# Patient Record
Sex: FEMALE | Race: WHITE | NOT HISPANIC OR LATINO | Employment: FULL TIME | URBAN - METROPOLITAN AREA
[De-identification: names, ages, dates, MRNs, and addresses within clinical notes are randomized per-mention and may not be internally consistent; named-entity substitution may affect disease eponyms.]

---

## 2017-06-29 ENCOUNTER — PROCEDURE VISIT (OUTPATIENT)
Dept: OBSTETRICS AND GYNECOLOGY | Age: 37
End: 2017-06-29

## 2017-06-29 ENCOUNTER — OFFICE VISIT (OUTPATIENT)
Dept: OBSTETRICS AND GYNECOLOGY | Age: 37
End: 2017-06-29

## 2017-06-29 VITALS
DIASTOLIC BLOOD PRESSURE: 70 MMHG | SYSTOLIC BLOOD PRESSURE: 110 MMHG | WEIGHT: 178 LBS | HEIGHT: 69 IN | BODY MASS INDEX: 26.36 KG/M2

## 2017-06-29 DIAGNOSIS — Z32.00 ENCOUNTER FOR CONFIRMATION OF PREGNANCY TEST RESULT WITH PHYSICAL EXAMINATION: ICD-10-CM

## 2017-06-29 DIAGNOSIS — O36.80X0 PREGNANCY WITH INCONCLUSIVE FETAL VIABILITY, NOT APPLICABLE OR UNSPECIFIED FETUS: Primary | ICD-10-CM

## 2017-06-29 DIAGNOSIS — Z34.81 PRENATAL CARE, SUBSEQUENT PREGNANCY, FIRST TRIMESTER: ICD-10-CM

## 2017-06-29 DIAGNOSIS — Z3A.09 9 WEEKS GESTATION OF PREGNANCY: Primary | ICD-10-CM

## 2017-06-29 DIAGNOSIS — O09.521 AMA (ADVANCED MATERNAL AGE) MULTIGRAVIDA 35+, FIRST TRIMESTER: ICD-10-CM

## 2017-06-29 PROBLEM — O09.529 AMA (ADVANCED MATERNAL AGE) MULTIGRAVIDA 35+: Status: ACTIVE | Noted: 2017-06-29

## 2017-06-29 PROCEDURE — 99213 OFFICE O/P EST LOW 20 MIN: CPT | Performed by: OBSTETRICS & GYNECOLOGY

## 2017-06-29 PROCEDURE — 76817 TRANSVAGINAL US OBSTETRIC: CPT | Performed by: OBSTETRICS & GYNECOLOGY

## 2017-06-29 RX ORDER — PRENATAL VIT NO.126/IRON/FOLIC 28MG-0.8MG
TABLET ORAL DAILY
COMMUNITY

## 2017-06-29 NOTE — PROGRESS NOTES
"GYN Visit    2017    Patient: Mikayla Li          MR#:[id number]      Chief Complaint   Patient presents with   • Initial Prenatal Visit     PT HERE TO EST VIABILITY OF PREGNANCY LMP 17. BETHANY 18.       History of Present Illness    36 y.o. 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    ________________________________________  Patient Active Problem List   Diagnosis   • AMA (advanced maternal age) multigravida 35+       No past medical history on file.    No past surgical history on file.    History   Smoking Status   • Not on file   Smokeless Tobacco   • Not on file       has a current medication list which includes the following prescription(s): prenatal (classic) vitamin.  ________________________________________    Current contraception: none      The following portions of the patient's history were reviewed and updated as appropriate: allergies, current medications, past family history, past medical history, past social history, past surgical history and problem list.    Review of Systems   Constitutional: Positive for fatigue. Negative for chills and fever.   Gastrointestinal: Negative for nausea and vomiting.   Genitourinary: Negative for dysuria, pelvic pain and vaginal bleeding.   Psychiatric/Behavioral: Negative for dysphoric mood.   All other systems reviewed and are negative.           Objective   Physical Exam    /70  Ht 69\" (175.3 cm)  Wt 178 lb (80.7 kg)  LMP 2017 (Exact Date)  BMI 26.29 kg/m2   BP Readings from Last 3 Encounters:   17 110/70      Wt Readings from Last 3 Encounters:   17 178 lb (80.7 kg)      BMI: Estimated body mass index is 26.29 kg/(m^2) as calculated " "from the following:    Height as of this encounter: 69\" (175.3 cm).    Weight as of this encounter: 178 lb (80.7 kg).      General:   alert, appears stated age and cooperative   Abdomen: soft, non-tender, without masses or organomegaly   Breast: Not examined   Vulva: normal   Vagina: normal mucosa   Cervix: no cervical motion tenderness and no lesions   Uterus: mobile, non-tender or size consistent with 9 weeks   Adnexa: normal adnexa and no mass, fullness, tenderness     US done in office:  Viable IUP cw LMP    Assessment:      Mikayla was seen today for initial prenatal visit.    Diagnoses and all orders for this visit:    9 weeks gestation of pregnancy  -     OB Panel With HIV  -     Hepatitis C Antibody  -     Urine Culture  -     Chlamydia trachomatis, Neisseria gonorrhoeae, PCR w/ confirmation    Encounter for confirmation of pregnancy test result with physical examination    AMA (advanced maternal age) multigravida 35+, first trimester        Will do NIPS testing at [facility]  Follow up 4 weeks prenatal visit      "

## 2017-07-01 LAB
ABO GROUP BLD: (no result)
BACTERIA UR CULT: NO GROWTH
BACTERIA UR CULT: NORMAL
BASOPHILS # BLD AUTO: 0 X10E3/UL (ref 0–0.2)
BASOPHILS NFR BLD AUTO: 0 %
BLD GP AB SCN SERPL QL: NEGATIVE
C TRACH RRNA SPEC QL NAA+PROBE: NEGATIVE
EOSINOPHIL # BLD AUTO: 0 X10E3/UL (ref 0–0.4)
EOSINOPHIL NFR BLD AUTO: 0 %
ERYTHROCYTE [DISTWIDTH] IN BLOOD BY AUTOMATED COUNT: 13.6 % (ref 12.3–15.4)
HBV SURFACE AG SERPL QL IA: NEGATIVE
HCT VFR BLD AUTO: 38.5 % (ref 34–46.6)
HCV AB S/CO SERPL IA: <0.1 S/CO RATIO (ref 0–0.9)
HGB BLD-MCNC: 13 G/DL (ref 11.1–15.9)
HIV 1+2 AB+HIV1 P24 AG SERPL QL IA: NON REACTIVE
IMM GRANULOCYTES # BLD: 0 X10E3/UL (ref 0–0.1)
IMM GRANULOCYTES NFR BLD: 0 %
LYMPHOCYTES # BLD AUTO: 1.5 X10E3/UL (ref 0.7–3.1)
LYMPHOCYTES NFR BLD AUTO: 19 %
MCH RBC QN AUTO: 29.5 PG (ref 26.6–33)
MCHC RBC AUTO-ENTMCNC: 33.8 G/DL (ref 31.5–35.7)
MCV RBC AUTO: 87 FL (ref 79–97)
MONOCYTES # BLD AUTO: 0.4 X10E3/UL (ref 0.1–0.9)
MONOCYTES NFR BLD AUTO: 5 %
N GONORRHOEA RRNA SPEC QL NAA+PROBE: NEGATIVE
NEUTROPHILS # BLD AUTO: 6.1 X10E3/UL (ref 1.4–7)
NEUTROPHILS NFR BLD AUTO: 76 %
PLATELET # BLD AUTO: 223 X10E3/UL (ref 150–379)
RBC # BLD AUTO: 4.41 X10E6/UL (ref 3.77–5.28)
RH BLD: POSITIVE
RPR SER QL: NON REACTIVE
RUBV IGG SERPL IA-ACNC: 2.99 INDEX
WBC # BLD AUTO: 8 X10E3/UL (ref 3.4–10.8)

## 2017-07-27 ENCOUNTER — ROUTINE PRENATAL (OUTPATIENT)
Dept: OBSTETRICS AND GYNECOLOGY | Age: 37
End: 2017-07-27

## 2017-07-27 VITALS — DIASTOLIC BLOOD PRESSURE: 64 MMHG | BODY MASS INDEX: 26.29 KG/M2 | WEIGHT: 178 LBS | SYSTOLIC BLOOD PRESSURE: 122 MMHG

## 2017-07-27 DIAGNOSIS — Z3A.13 13 WEEKS GESTATION OF PREGNANCY: Primary | ICD-10-CM

## 2017-07-27 DIAGNOSIS — O09.521 AMA (ADVANCED MATERNAL AGE) MULTIGRAVIDA 35+, FIRST TRIMESTER: ICD-10-CM

## 2017-07-27 PROCEDURE — 0501F PRENATAL FLOW SHEET: CPT | Performed by: OBSTETRICS & GYNECOLOGY

## 2017-07-27 NOTE — PROGRESS NOTES
Feeling well, NIPs testing, will do gender reveal, afp at follow up, anatomy at 20 weeks  No complaints  Traveling to [place] in a couple of weeks

## 2017-08-03 ENCOUNTER — TELEPHONE (OUTPATIENT)
Dept: OBSTETRICS AND GYNECOLOGY | Age: 37
End: 2017-08-03

## 2017-08-04 ENCOUNTER — TELEPHONE (OUTPATIENT)
Dept: OBSTETRICS AND GYNECOLOGY | Age: 37
End: 2017-08-04

## 2017-08-10 ENCOUNTER — TELEPHONE (OUTPATIENT)
Dept: OBSTETRICS AND GYNECOLOGY | Age: 37
End: 2017-08-10

## 2017-08-10 RX ORDER — DOXYLAMINE SUCCINATE AND PYRIDOXINE HYDROCHLORIDE, DELAYED RELEASE TABLETS 10 MG/10 MG 10; 10 MG/1; MG/1
1 TABLET, DELAYED RELEASE ORAL 3 TIMES DAILY
Qty: 100 TABLET | Refills: 0 | Status: SHIPPED | OUTPATIENT
Start: 2017-08-10 | End: 2017-08-24

## 2017-08-10 NOTE — TELEPHONE ENCOUNTER
Dr GRAVES pt, 15 wks preg. States increased morning sickness over last two weeks. Pt states had diarrhea for 4 days last week and took imodium to clear it up. Diarrhea came back this week, has taken imodium and cleared up for now. Pt still having vomiting. Denies decreased skin turgor. Pt states she did have some chills last week, denies any chills currently and denies any fever. Please advise.    Pt # [phone]

## 2017-08-10 NOTE — TELEPHONE ENCOUNTER
Pt has been not'd of the B6 w/ unisom treatment. Would like something sent to pharm, has not tried anything no pref. On med. Does not feel dehydrated but will go for fluids of seh feels liek she has become dehydrated, already doing small frequent liquids.

## 2017-08-10 NOTE — TELEPHONE ENCOUNTER
If she feels dehydrated at all she should go to the hospital for IV hydration.  Has she tried anything for the morning sickness?  I can send in diclegis if she wants to try it or phenergan prn but that does cause drowsiness.  Using immodium is ok and hydrate orally  With very small amounts every few hours to prevent dehydration.

## 2017-08-11 ENCOUNTER — TELEPHONE (OUTPATIENT)
Dept: OBSTETRICS AND GYNECOLOGY | Age: 37
End: 2017-08-11

## 2017-08-11 RX ORDER — ERYTHROMYCIN BASE IN ETHANOL 2 %
1 SWAB, MEDICATED TOPICAL 2 TIMES DAILY
Qty: 60 EACH | Refills: 2 | Status: SHIPPED | OUTPATIENT
Start: 2017-08-11 | End: 2017-09-12

## 2017-08-11 NOTE — TELEPHONE ENCOUNTER
Can use OTC benzyl peroxide washes and spot cream, I can also send in erythromycin pads to use twice daily, does not cross placenta- I sent into pharm

## 2017-08-11 NOTE — TELEPHONE ENCOUNTER
Pt is having cystic acne and is wanting to know what she can take to help? Pt states they have become painful.

## 2017-08-24 ENCOUNTER — ROUTINE PRENATAL (OUTPATIENT)
Dept: OBSTETRICS AND GYNECOLOGY | Age: 37
End: 2017-08-24

## 2017-08-24 VITALS — SYSTOLIC BLOOD PRESSURE: 106 MMHG | WEIGHT: 179 LBS | BODY MASS INDEX: 26.43 KG/M2 | DIASTOLIC BLOOD PRESSURE: 66 MMHG

## 2017-08-24 DIAGNOSIS — Z13.79 ENCOUNTER FOR OTHER SCREENING FOR GENETIC AND CHROMOSOMAL ANOMALIES: ICD-10-CM

## 2017-08-24 DIAGNOSIS — Z3A.17 17 WEEKS GESTATION OF PREGNANCY: Primary | ICD-10-CM

## 2017-08-24 DIAGNOSIS — O09.521 AMA (ADVANCED MATERNAL AGE) MULTIGRAVIDA 35+, FIRST TRIMESTER: ICD-10-CM

## 2017-08-24 PROCEDURE — 0502F SUBSEQUENT PRENATAL CARE: CPT | Performed by: OBSTETRICS & GYNECOLOGY

## 2017-08-24 NOTE — PROGRESS NOTES
Going to [place] and [place] in next month, rec start ASA 81 mg daily for AMA and extensive travel  AFP today  Having cystic acne issues, will make appt with derm, same deep nodules for over a month- will try short antibiotic course  Nausea improved  Wants compression socks  Anatomy scan at [facility]- with travel schedule on time they can do it is on a Tuesday morning, I am not in office but I am on call and available- sched with NP  Gender placed in envelope from nips testing for gender reveal party

## 2017-08-26 LAB
AFP ADJ MOM SERPL: 1.39
AFP INTERP SERPL-IMP: NORMAL
AFP INTERP SERPL-IMP: NORMAL
AFP SERPL-MCNC: 46.9 NG/ML
AGE AT DELIVERY: 35.3 YEARS
GA METHOD: NORMAL
GA: 17 WEEKS
IDDM PATIENT QL: NO
LABORATORY COMMENT REPORT: NORMAL
MULTIPLE PREGNANCY: NO
NEURAL TUBE DEFECT RISK FETUS: 3704 %
RESULT: NORMAL

## 2017-08-28 ENCOUNTER — TELEPHONE (OUTPATIENT)
Dept: OBSTETRICS AND GYNECOLOGY | Age: 37
End: 2017-08-28

## 2017-08-28 NOTE — TELEPHONE ENCOUNTER
Dr GRAVES pt was given instructions to start baby asa daily - pt is concerned after looking on line and wants to know why Dr GRAVES recommends daily? If it due to her plane travel can she just take it the day before a flight and the day of the flight instead of daily? Also the pt has a topical clobetasol propionate  0.05% is this ok to use in pregnancy?

## 2017-08-28 NOTE — TELEPHONE ENCOUNTER
Risk factors for preeclampsia is first baby and age (AMA)- the baby aspirin is used to prevent preeclampsia  She is not high risk but she is not at the lowest risk because of age  Long plane flights made me lean toward treatment   Little risk to doing it but I am ok if she doesn't want to do it. - her choice    Discussed with pt, she will probably take it

## 2017-09-12 ENCOUNTER — ROUTINE PRENATAL (OUTPATIENT)
Dept: OBSTETRICS AND GYNECOLOGY | Age: 37
End: 2017-09-12

## 2017-09-12 ENCOUNTER — PROCEDURE VISIT (OUTPATIENT)
Dept: OBSTETRICS AND GYNECOLOGY | Age: 37
End: 2017-09-12

## 2017-09-12 VITALS — DIASTOLIC BLOOD PRESSURE: 60 MMHG | BODY MASS INDEX: 27.47 KG/M2 | WEIGHT: 186 LBS | SYSTOLIC BLOOD PRESSURE: 110 MMHG

## 2017-09-12 DIAGNOSIS — Z3A.19 19 WEEKS GESTATION OF PREGNANCY: Primary | ICD-10-CM

## 2017-09-12 DIAGNOSIS — Z34.92 SECOND TRIMESTER FETUS: Primary | ICD-10-CM

## 2017-09-12 PROCEDURE — 0502F SUBSEQUENT PRENATAL CARE: CPT | Performed by: PHYSICIAN ASSISTANT

## 2017-09-12 PROCEDURE — 76805 OB US >/= 14 WKS SNGL FETUS: CPT | Performed by: PHYSICIAN ASSISTANT

## 2017-09-12 RX ORDER — ASPIRIN 325 MG
325 TABLET ORAL DAILY
COMMUNITY
End: 2017-10-12

## 2017-09-12 NOTE — PROGRESS NOTES
This is a routine Ob visit for 19wk5 day. She is also having her anatomic scan.  It was wnl.  She is having a boy (they already knew gender).  She just returned from [place] and her trip went well. She had no c/o and didn't need to use compression hose.  She is getting ready to go to [place] for work.  Plans to walk when able and use compression hose if needed.  Will plan f/u in 4 wks.

## 2017-10-12 ENCOUNTER — ROUTINE PRENATAL (OUTPATIENT)
Dept: OBSTETRICS AND GYNECOLOGY | Age: 37
End: 2017-10-12

## 2017-10-12 VITALS — DIASTOLIC BLOOD PRESSURE: 60 MMHG | WEIGHT: 185 LBS | SYSTOLIC BLOOD PRESSURE: 120 MMHG | BODY MASS INDEX: 27.32 KG/M2

## 2017-10-12 DIAGNOSIS — Z23 FLU VACCINE NEED: ICD-10-CM

## 2017-10-12 DIAGNOSIS — Z3A.24 24 WEEKS GESTATION OF PREGNANCY: Primary | ICD-10-CM

## 2017-10-12 PROCEDURE — 90471 IMMUNIZATION ADMIN: CPT | Performed by: OBSTETRICS & GYNECOLOGY

## 2017-10-12 PROCEDURE — 90686 IIV4 VACC NO PRSV 0.5 ML IM: CPT | Performed by: OBSTETRICS & GYNECOLOGY

## 2017-10-12 PROCEDURE — 0502F SUBSEQUENT PRENATAL CARE: CPT | Performed by: OBSTETRICS & GYNECOLOGY

## 2017-10-12 NOTE — PROGRESS NOTES
Doing well, late in day and unable to do 1 hr GCT, pelvic pressure but intermittent and no contractions, discharge , leaking or bleeding  Cervical check reassuring, will do 1 hr GCT and CBC 10/20

## 2017-10-20 ENCOUNTER — LAB (OUTPATIENT)
Dept: OBSTETRICS AND GYNECOLOGY | Age: 37
End: 2017-10-20

## 2017-10-20 DIAGNOSIS — Z13.1 SCREENING FOR DIABETES MELLITUS: Primary | ICD-10-CM

## 2017-10-20 DIAGNOSIS — Z3A.25 25 WEEKS GESTATION OF PREGNANCY: ICD-10-CM

## 2017-10-21 LAB
BASOPHILS # BLD AUTO: 0.02 10*3/MM3 (ref 0–0.2)
BASOPHILS NFR BLD AUTO: 0.2 % (ref 0–1.5)
EOSINOPHIL # BLD AUTO: 0.03 10*3/MM3 (ref 0–0.7)
EOSINOPHIL NFR BLD AUTO: 0.3 % (ref 0.3–6.2)
ERYTHROCYTE [DISTWIDTH] IN BLOOD BY AUTOMATED COUNT: 13.6 % (ref 11.7–13)
GLUCOSE 1H P 50 G GLC PO SERPL-MCNC: 109 MG/DL (ref 65–139)
HCT VFR BLD AUTO: 38.4 % (ref 35.6–45.5)
HGB BLD-MCNC: 12.7 G/DL (ref 11.9–15.5)
IMM GRANULOCYTES # BLD: 0.02 10*3/MM3 (ref 0–0.03)
IMM GRANULOCYTES NFR BLD: 0.2 % (ref 0–0.5)
LYMPHOCYTES # BLD AUTO: 1.68 10*3/MM3 (ref 0.9–4.8)
LYMPHOCYTES NFR BLD AUTO: 15.5 % (ref 19.6–45.3)
MCH RBC QN AUTO: 31.4 PG (ref 26.9–32)
MCHC RBC AUTO-ENTMCNC: 33.1 G/DL (ref 32.4–36.3)
MCV RBC AUTO: 95 FL (ref 80.5–98.2)
MONOCYTES # BLD AUTO: 0.33 10*3/MM3 (ref 0.2–1.2)
MONOCYTES NFR BLD AUTO: 3.1 % (ref 5–12)
NEUTROPHILS # BLD AUTO: 8.73 10*3/MM3 (ref 1.9–8.1)
NEUTROPHILS NFR BLD AUTO: 80.7 % (ref 42.7–76)
PLATELET # BLD AUTO: 216 10*3/MM3 (ref 140–500)
RBC # BLD AUTO: 4.04 10*6/MM3 (ref 3.9–5.2)
WBC # BLD AUTO: 10.81 10*3/MM3 (ref 4.5–10.7)

## 2017-10-24 ENCOUNTER — TELEPHONE (OUTPATIENT)
Dept: OBSTETRICS AND GYNECOLOGY | Age: 37
End: 2017-10-24

## 2017-10-24 NOTE — TELEPHONE ENCOUNTER
You can work her in today or tomorrow with me.  This sounds like round ligament pain but can evaluate to be sure.

## 2017-10-24 NOTE — TELEPHONE ENCOUNTER
Dr GRAVES pt, 25 wks preg. States since yesterday afternoon has had a dull ache in her right side, goes across her lower abdomen. States last night the pain was a sharp stabbing pain. Hurts to walk/move around. Pt states it does not feel like contractions, denies any bleeding. Please advise, no US will need to be WI if done.    Pt # [phone]

## 2017-10-25 ENCOUNTER — ROUTINE PRENATAL (OUTPATIENT)
Dept: OBSTETRICS AND GYNECOLOGY | Age: 37
End: 2017-10-25

## 2017-10-25 VITALS — DIASTOLIC BLOOD PRESSURE: 70 MMHG | BODY MASS INDEX: 27.91 KG/M2 | WEIGHT: 189 LBS | SYSTOLIC BLOOD PRESSURE: 120 MMHG

## 2017-10-25 DIAGNOSIS — R10.2 PELVIC PAIN AFFECTING PREGNANCY IN SECOND TRIMESTER, ANTEPARTUM: Primary | ICD-10-CM

## 2017-10-25 DIAGNOSIS — O26.892 PELVIC PAIN AFFECTING PREGNANCY IN SECOND TRIMESTER, ANTEPARTUM: Primary | ICD-10-CM

## 2017-10-25 LAB
BILIRUB BLD-MCNC: NEGATIVE MG/DL
CLARITY, POC: CLEAR
COLOR UR: YELLOW
GLUCOSE UR STRIP-MCNC: NEGATIVE MG/DL
KETONES UR QL: NEGATIVE
LEUKOCYTE EST, POC: NEGATIVE
NITRITE UR-MCNC: NEGATIVE MG/ML
PH UR: 6 [PH] (ref 5–8)
PROT UR STRIP-MCNC: NEGATIVE MG/DL
RBC # UR STRIP: NEGATIVE /UL
SP GR UR: 1.02 (ref 1–1.03)
UROBILINOGEN UR QL: NORMAL

## 2017-10-25 PROCEDURE — 0502F SUBSEQUENT PRENATAL CARE: CPT | Performed by: PHYSICIAN ASSISTANT

## 2017-10-25 PROCEDURE — 81002 URINALYSIS NONAUTO W/O SCOPE: CPT | Performed by: PHYSICIAN ASSISTANT

## 2017-10-25 NOTE — PROGRESS NOTES
This is a 25 wk pregnant pt that is having some pain that starts at the top of her fundus and radiates to the rlq. It started at night. Does seem to be better today.  Worse with movement. No pattern to it.  She did do a workout the other day that may have started the symptoms.  She denies VB and good FM. No change in bladder or bowel function. No fever or N/V.  Physical exam of pt is wnl. She is in NAD.  Suspect round ligament pain. Enc tylenol prn pain and enc belly support. Gave HO.  Will send urine as well. A/P-25 wk pregnant pt with rlq pain, neg urine and good FM. Suspect round ligament pain. R/o UTI and get belly support. If s/s don't improve, consider PT and/or call for f/u. F/u as scheduled

## 2017-10-27 LAB
BACTERIA UR CULT: NO GROWTH
BACTERIA UR CULT: NORMAL

## 2017-10-30 ENCOUNTER — TELEPHONE (OUTPATIENT)
Dept: OBSTETRICS AND GYNECOLOGY | Age: 37
End: 2017-10-30

## 2017-10-30 NOTE — TELEPHONE ENCOUNTER
----- Message from JAY JAY Ferguson sent at 10/27/2017  8:48 AM EDT -----  Let her know her urine culture is negative for infection

## 2017-11-09 ENCOUNTER — PROCEDURE VISIT (OUTPATIENT)
Dept: OBSTETRICS AND GYNECOLOGY | Age: 37
End: 2017-11-09

## 2017-11-09 ENCOUNTER — ROUTINE PRENATAL (OUTPATIENT)
Dept: OBSTETRICS AND GYNECOLOGY | Age: 37
End: 2017-11-09

## 2017-11-09 VITALS — BODY MASS INDEX: 28.65 KG/M2 | WEIGHT: 194 LBS | SYSTOLIC BLOOD PRESSURE: 110 MMHG | DIASTOLIC BLOOD PRESSURE: 60 MMHG

## 2017-11-09 DIAGNOSIS — Z3A.28 28 WEEKS GESTATION OF PREGNANCY: ICD-10-CM

## 2017-11-09 DIAGNOSIS — O26.892 PELVIC PAIN AFFECTING PREGNANCY IN SECOND TRIMESTER, ANTEPARTUM: Primary | ICD-10-CM

## 2017-11-09 DIAGNOSIS — O47.03 PRETERM UTERINE CONTRACTIONS IN THIRD TRIMESTER, ANTEPARTUM: ICD-10-CM

## 2017-11-09 DIAGNOSIS — Z23 NEED FOR TDAP VACCINATION: Primary | ICD-10-CM

## 2017-11-09 DIAGNOSIS — R10.2 PELVIC PAIN AFFECTING PREGNANCY IN SECOND TRIMESTER, ANTEPARTUM: Primary | ICD-10-CM

## 2017-11-09 DIAGNOSIS — O09.523 ELDERLY MULTIGRAVIDA IN THIRD TRIMESTER: ICD-10-CM

## 2017-11-09 PROCEDURE — 0502F SUBSEQUENT PRENATAL CARE: CPT | Performed by: OBSTETRICS & GYNECOLOGY

## 2017-11-09 PROCEDURE — 76817 TRANSVAGINAL US OBSTETRIC: CPT | Performed by: OBSTETRICS & GYNECOLOGY

## 2017-11-09 PROCEDURE — 90715 TDAP VACCINE 7 YRS/> IM: CPT | Performed by: OBSTETRICS & GYNECOLOGY

## 2017-11-09 PROCEDURE — 90471 IMMUNIZATION ADMIN: CPT | Performed by: OBSTETRICS & GYNECOLOGY

## 2017-11-09 NOTE — PROGRESS NOTES
Pt with tightening and pressure, possible BHC, otherwise ok, US for growth at [facility], will do CL today screening  Discussed birth plan  dtap given  See US report, CL is 3.4 cm

## 2017-11-20 ENCOUNTER — PROCEDURE VISIT (OUTPATIENT)
Dept: OBSTETRICS AND GYNECOLOGY | Age: 37
End: 2017-11-20

## 2017-11-20 ENCOUNTER — ROUTINE PRENATAL (OUTPATIENT)
Dept: OBSTETRICS AND GYNECOLOGY | Age: 37
End: 2017-11-20

## 2017-11-20 VITALS — BODY MASS INDEX: 28.94 KG/M2 | DIASTOLIC BLOOD PRESSURE: 70 MMHG | WEIGHT: 196 LBS | SYSTOLIC BLOOD PRESSURE: 122 MMHG

## 2017-11-20 DIAGNOSIS — O36.63X0 EXCESSIVE FETAL GROWTH AFFECTING MANAGEMENT OF PREGNANCY IN THIRD TRIMESTER, SINGLE OR UNSPECIFIED FETUS: ICD-10-CM

## 2017-11-20 DIAGNOSIS — Z3A.29 29 WEEKS GESTATION OF PREGNANCY: ICD-10-CM

## 2017-11-20 DIAGNOSIS — O09.523 ELDERLY MULTIGRAVIDA IN THIRD TRIMESTER: Primary | ICD-10-CM

## 2017-11-20 DIAGNOSIS — Z36.89 ULTRASOUND SCAN TO CHECK INTERVAL GROWTH OF FETUS: ICD-10-CM

## 2017-11-20 DIAGNOSIS — O09.523 ELDERLY MULTIGRAVIDA IN THIRD TRIMESTER: ICD-10-CM

## 2017-11-20 DIAGNOSIS — Z3A.29 29 WEEKS GESTATION OF PREGNANCY: Primary | ICD-10-CM

## 2017-11-20 PROCEDURE — 76816 OB US FOLLOW-UP PER FETUS: CPT | Performed by: OBSTETRICS & GYNECOLOGY

## 2017-11-20 PROCEDURE — 0502F SUBSEQUENT PRENATAL CARE: CPT | Performed by: OBSTETRICS & GYNECOLOGY

## 2017-11-20 NOTE — PROGRESS NOTES
Doing well, [facility] unchanged, see US 92 %- trending LGA- will recheck at 6 weeks with her first weekly BPP

## 2024-06-21 NOTE — TELEPHONE ENCOUNTER
----- Message from Airam Singh MD sent at 8/4/2017 12:00 PM EDT -----  Notify negative, may give gender if pt desires   73